# Patient Record
Sex: MALE | Race: WHITE | NOT HISPANIC OR LATINO | Employment: UNEMPLOYED | URBAN - NONMETROPOLITAN AREA
[De-identification: names, ages, dates, MRNs, and addresses within clinical notes are randomized per-mention and may not be internally consistent; named-entity substitution may affect disease eponyms.]

---
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
## 2024-02-25 ENCOUNTER — APPOINTMENT (OUTPATIENT)
Dept: CT IMAGING | Facility: HOSPITAL | Age: 24
End: 2024-02-25
Payer: MEDICAID

## 2024-02-25 ENCOUNTER — HOSPITAL ENCOUNTER (EMERGENCY)
Facility: HOSPITAL | Age: 24
Discharge: HOME | End: 2024-02-25
Attending: EMERGENCY MEDICINE | Admitting: EMERGENCY MEDICINE
Payer: MEDICAID

## 2024-02-25 VITALS
DIASTOLIC BLOOD PRESSURE: 83 MMHG | HEIGHT: 66 IN | RESPIRATION RATE: 16 BRPM | BODY MASS INDEX: 34.55 KG/M2 | TEMPERATURE: 98 F | HEART RATE: 90 BPM | SYSTOLIC BLOOD PRESSURE: 178 MMHG | OXYGEN SATURATION: 97 % | WEIGHT: 215 LBS

## 2024-02-25 DIAGNOSIS — N20.0 KIDNEY STONE: Primary | ICD-10-CM

## 2024-02-25 LAB
ALBUMIN SERPL-MCNC: 4.6 G/DL (ref 3.5–5.2)
ALBUMIN/GLOB SERPL: 1.3 G/DL
ALP SERPL-CCNC: 76 U/L (ref 39–117)
ALT SERPL W P-5'-P-CCNC: 20 U/L (ref 1–41)
ANION GAP SERPL CALCULATED.3IONS-SCNC: 14.4 MMOL/L (ref 5–15)
AST SERPL-CCNC: 25 U/L (ref 1–40)
BACTERIA UR QL AUTO: ABNORMAL /HPF
BASOPHILS # BLD AUTO: 0.08 10*3/MM3 (ref 0–0.2)
BASOPHILS NFR BLD AUTO: 0.8 % (ref 0–1.5)
BILIRUB SERPL-MCNC: 0.3 MG/DL (ref 0–1.2)
BILIRUB UR QL STRIP: NEGATIVE
BUN SERPL-MCNC: 23 MG/DL (ref 6–20)
BUN/CREAT SERPL: 20.7 (ref 7–25)
CALCIUM SPEC-SCNC: 9 MG/DL (ref 8.6–10.5)
CHLORIDE SERPL-SCNC: 107 MMOL/L (ref 98–107)
CLARITY UR: ABNORMAL
CO2 SERPL-SCNC: 21.6 MMOL/L (ref 22–29)
COLOR UR: YELLOW
CREAT SERPL-MCNC: 1.11 MG/DL (ref 0.76–1.27)
DEPRECATED RDW RBC AUTO: 43.7 FL (ref 37–54)
EGFRCR SERPLBLD CKD-EPI 2021: 96.9 ML/MIN/1.73
EOSINOPHIL # BLD AUTO: 0.31 10*3/MM3 (ref 0–0.4)
EOSINOPHIL NFR BLD AUTO: 3.2 % (ref 0.3–6.2)
ERYTHROCYTE [DISTWIDTH] IN BLOOD BY AUTOMATED COUNT: 13.1 % (ref 12.3–15.4)
GLOBULIN UR ELPH-MCNC: 3.5 GM/DL
GLUCOSE SERPL-MCNC: 135 MG/DL (ref 65–99)
GLUCOSE UR STRIP-MCNC: NEGATIVE MG/DL
HCT VFR BLD AUTO: 46.3 % (ref 37.5–51)
HGB BLD-MCNC: 15.2 G/DL (ref 13–17.7)
HGB UR QL STRIP.AUTO: ABNORMAL
HYALINE CASTS UR QL AUTO: ABNORMAL /LPF
IMM GRANULOCYTES # BLD AUTO: 0.06 10*3/MM3 (ref 0–0.05)
IMM GRANULOCYTES NFR BLD AUTO: 0.6 % (ref 0–0.5)
KETONES UR QL STRIP: NEGATIVE
LEUKOCYTE ESTERASE UR QL STRIP.AUTO: NEGATIVE
LYMPHOCYTES # BLD AUTO: 1.49 10*3/MM3 (ref 0.7–3.1)
LYMPHOCYTES NFR BLD AUTO: 15.4 % (ref 19.6–45.3)
MCH RBC QN AUTO: 29.9 PG (ref 26.6–33)
MCHC RBC AUTO-ENTMCNC: 32.8 G/DL (ref 31.5–35.7)
MCV RBC AUTO: 91 FL (ref 79–97)
MONOCYTES # BLD AUTO: 0.53 10*3/MM3 (ref 0.1–0.9)
MONOCYTES NFR BLD AUTO: 5.5 % (ref 5–12)
NEUTROPHILS NFR BLD AUTO: 7.23 10*3/MM3 (ref 1.7–7)
NEUTROPHILS NFR BLD AUTO: 74.5 % (ref 42.7–76)
NITRITE UR QL STRIP: NEGATIVE
NRBC BLD AUTO-RTO: 0 /100 WBC (ref 0–0.2)
PH UR STRIP.AUTO: 5.5 [PH] (ref 5–8)
PLATELET # BLD AUTO: 265 10*3/MM3 (ref 140–450)
PMV BLD AUTO: 10.1 FL (ref 6–12)
POTASSIUM SERPL-SCNC: 4 MMOL/L (ref 3.5–5.2)
PROT SERPL-MCNC: 8.1 G/DL (ref 6–8.5)
PROT UR QL STRIP: ABNORMAL
RBC # BLD AUTO: 5.09 10*6/MM3 (ref 4.14–5.8)
RBC # UR STRIP: ABNORMAL /HPF
REF LAB TEST METHOD: ABNORMAL
SODIUM SERPL-SCNC: 143 MMOL/L (ref 136–145)
SP GR UR STRIP: >1.03 (ref 1–1.03)
SQUAMOUS #/AREA URNS HPF: ABNORMAL /HPF
UROBILINOGEN UR QL STRIP: ABNORMAL
WBC # UR STRIP: ABNORMAL /HPF
WBC NRBC COR # BLD AUTO: 9.7 10*3/MM3 (ref 3.4–10.8)

## 2024-02-25 PROCEDURE — 99284 EMERGENCY DEPT VISIT MOD MDM: CPT

## 2024-02-25 PROCEDURE — 81001 URINALYSIS AUTO W/SCOPE: CPT | Performed by: PHYSICIAN ASSISTANT

## 2024-02-25 PROCEDURE — 96374 THER/PROPH/DIAG INJ IV PUSH: CPT

## 2024-02-25 PROCEDURE — 74176 CT ABD & PELVIS W/O CONTRAST: CPT | Performed by: RADIOLOGY

## 2024-02-25 PROCEDURE — 85025 COMPLETE CBC W/AUTO DIFF WBC: CPT | Performed by: PHYSICIAN ASSISTANT

## 2024-02-25 PROCEDURE — 25010000002 ONDANSETRON PER 1 MG: Performed by: PHYSICIAN ASSISTANT

## 2024-02-25 PROCEDURE — 25810000003 SODIUM CHLORIDE 0.9 % SOLUTION: Performed by: PHYSICIAN ASSISTANT

## 2024-02-25 PROCEDURE — 96375 TX/PRO/DX INJ NEW DRUG ADDON: CPT

## 2024-02-25 PROCEDURE — 74176 CT ABD & PELVIS W/O CONTRAST: CPT

## 2024-02-25 PROCEDURE — 25010000002 HYDROMORPHONE 1 MG/ML SOLUTION: Performed by: EMERGENCY MEDICINE

## 2024-02-25 PROCEDURE — 25010000002 KETOROLAC TROMETHAMINE PER 15 MG: Performed by: PHYSICIAN ASSISTANT

## 2024-02-25 PROCEDURE — 25010000002 PROCHLORPERAZINE 10 MG/2ML SOLUTION: Performed by: PHYSICIAN ASSISTANT

## 2024-02-25 PROCEDURE — 80053 COMPREHEN METABOLIC PANEL: CPT | Performed by: PHYSICIAN ASSISTANT

## 2024-02-25 RX ORDER — HYDROCODONE BITARTRATE AND ACETAMINOPHEN 7.5; 325 MG/1; MG/1
1 TABLET ORAL EVERY 6 HOURS PRN
Qty: 12 TABLET | Refills: 0 | Status: SHIPPED | OUTPATIENT
Start: 2024-02-25

## 2024-02-25 RX ORDER — SODIUM CHLORIDE 0.9 % (FLUSH) 0.9 %
10 SYRINGE (ML) INJECTION AS NEEDED
Status: DISCONTINUED | OUTPATIENT
Start: 2024-02-25 | End: 2024-02-25 | Stop reason: HOSPADM

## 2024-02-25 RX ORDER — PROCHLORPERAZINE EDISYLATE 5 MG/ML
10 INJECTION INTRAMUSCULAR; INTRAVENOUS ONCE
Status: COMPLETED | OUTPATIENT
Start: 2024-02-25 | End: 2024-02-25

## 2024-02-25 RX ORDER — ONDANSETRON 2 MG/ML
4 INJECTION INTRAMUSCULAR; INTRAVENOUS ONCE
Status: COMPLETED | OUTPATIENT
Start: 2024-02-25 | End: 2024-02-25

## 2024-02-25 RX ORDER — TAMSULOSIN HYDROCHLORIDE 0.4 MG/1
1 CAPSULE ORAL DAILY
Qty: 30 CAPSULE | Refills: 0 | Status: SHIPPED | OUTPATIENT
Start: 2024-02-25

## 2024-02-25 RX ORDER — KETOROLAC TROMETHAMINE 30 MG/ML
30 INJECTION, SOLUTION INTRAMUSCULAR; INTRAVENOUS ONCE
Status: COMPLETED | OUTPATIENT
Start: 2024-02-25 | End: 2024-02-25

## 2024-02-25 RX ADMIN — HYDROMORPHONE HYDROCHLORIDE 1 MG: 1 INJECTION, SOLUTION INTRAMUSCULAR; INTRAVENOUS; SUBCUTANEOUS at 15:21

## 2024-02-25 RX ADMIN — ONDANSETRON 4 MG: 2 INJECTION INTRAMUSCULAR; INTRAVENOUS at 12:49

## 2024-02-25 RX ADMIN — KETOROLAC TROMETHAMINE 30 MG: 30 INJECTION, SOLUTION INTRAMUSCULAR at 12:50

## 2024-02-25 RX ADMIN — PROCHLORPERAZINE EDISYLATE 10 MG: 5 INJECTION INTRAMUSCULAR; INTRAVENOUS at 13:57

## 2024-02-25 RX ADMIN — SODIUM CHLORIDE 1000 ML: 9 INJECTION, SOLUTION INTRAVENOUS at 12:48

## 2024-02-25 NOTE — ED PROVIDER NOTES
Subjective   History of Present Illness  Pt reports waking up a couple hours PTA with severe pain R lower abdomen, hx of kidney stone  Denies any fever   Had nausea and vomiting pta   Pt has no PMHX       History provided by:  Patient   used: No    Abdominal Pain  Pain location:  R flank  Pain quality: aching    Pain radiates to:  Does not radiate  Pain severity:  Moderate  Onset quality:  Sudden  Timing:  Constant  Progression:  Worsening  Chronicity:  New  Relieved by:  Nothing  Worsened by:  Nothing  Ineffective treatments:  None tried      Review of Systems   Gastrointestinal:  Positive for abdominal pain.       Past Medical History:   Diagnosis Date    Asthma     GERD (gastroesophageal reflux disease)     Kidney stone        No Known Allergies    Past Surgical History:   Procedure Laterality Date    CYSTOSCOPY URETEROSCOPY LASER LITHOTRIPSY Right 7/8/2020    Procedure: RIGHT CYSTOSCOPY URETEROSCOPY WITH RIGHT RETROGRADES;  Surgeon: Cornell Tate MD;  Location: [facility];  Service: Urology;  Laterality: Right;    EAR TUBES         Family History   Problem Relation Age of Onset    No Known Problems Father     No Known Problems Mother        Social History     Socioeconomic History    Marital status: Single   Tobacco Use    Smoking status: Never    Smokeless tobacco: Never   Vaping Use    Vaping Use: Never used   Substance and Sexual Activity    Alcohol use: Never    Drug use: Never    Sexual activity: Defer           Objective   Physical Exam  Vitals and nursing note reviewed.   Constitutional:       Appearance: He is well-developed.   HENT:      Head: Normocephalic.   Cardiovascular:      Rate and Rhythm: Normal rate and regular rhythm.   Pulmonary:      Effort: Pulmonary effort is normal.      Breath sounds: Normal breath sounds.   Abdominal:      General: Bowel sounds are normal.      Palpations: Abdomen is soft.      Tenderness: There is no abdominal tenderness. There is no  guarding or rebound. Negative signs include McBurney's sign.   Musculoskeletal:         General: Normal range of motion.      Cervical back: Neck supple.   Skin:     General: Skin is warm and dry.   Neurological:      Mental Status: He is alert and oriented to person, place, and time.   Psychiatric:         Behavior: Behavior normal.         Thought Content: Thought content normal.         Judgment: Judgment normal.         Procedures           ED Course      Results for orders placed or performed during the hospital encounter of 02/25/24   Comprehensive Metabolic Panel    Specimen: Blood   Result Value Ref Range    Glucose 135 (H) 65 - 99 mg/dL    BUN 23 (H) 6 - 20 mg/dL    Creatinine 1.11 0.76 - 1.27 mg/dL    Sodium 143 136 - 145 mmol/L    Potassium 4.0 3.5 - 5.2 mmol/L    Chloride 107 98 - 107 mmol/L    CO2 21.6 (L) 22.0 - 29.0 mmol/L    Calcium 9.0 8.6 - 10.5 mg/dL    Total Protein 8.1 6.0 - 8.5 g/dL    Albumin 4.6 3.5 - 5.2 g/dL    ALT (SGPT) 20 1 - 41 U/L    AST (SGOT) 25 1 - 40 U/L    Alkaline Phosphatase 76 39 - 117 U/L    Total Bilirubin 0.3 0.0 - 1.2 mg/dL    Globulin 3.5 gm/dL    A/G Ratio 1.3 g/dL    BUN/Creatinine Ratio 20.7 7.0 - 25.0    Anion Gap 14.4 5.0 - 15.0 mmol/L    eGFR 96.9 >60.0 mL/min/1.73   Urinalysis With Culture If Indicated - Urine, Clean Catch    Specimen: Urine, Clean Catch   Result Value Ref Range    Color, UA Yellow Yellow, Straw    Appearance, UA Cloudy (A) Clear    pH, UA 5.5 5.0 - 8.0    Specific Gravity, UA >1.030 (H) 1.005 - 1.030    Glucose, UA Negative Negative    Ketones, UA Negative Negative    Bilirubin, UA Negative Negative    Blood, UA Large (3+) (A) Negative    Protein, UA 30 mg/dL (1+) (A) Negative    Leuk Esterase, UA Negative Negative    Nitrite, UA Negative Negative    Urobilinogen, UA 0.2 E.U./dL 0.2 - 1.0 E.U./dL   CBC Auto Differential    Specimen: Blood   Result Value Ref Range    WBC 9.70 3.40 - 10.80 10*3/mm3    RBC 5.09 4.14 - 5.80 10*6/mm3    Hemoglobin 15.2  13.0 - 17.7 g/dL    Hematocrit 46.3 37.5 - 51.0 %    MCV 91.0 79.0 - 97.0 fL    MCH 29.9 26.6 - 33.0 pg    MCHC 32.8 31.5 - 35.7 g/dL    RDW 13.1 12.3 - 15.4 %    RDW-SD 43.7 37.0 - 54.0 fl    MPV 10.1 6.0 - 12.0 fL    Platelets 265 140 - 450 10*3/mm3    Neutrophil % 74.5 42.7 - 76.0 %    Lymphocyte % 15.4 (L) 19.6 - 45.3 %    Monocyte % 5.5 5.0 - 12.0 %    Eosinophil % 3.2 0.3 - 6.2 %    Basophil % 0.8 0.0 - 1.5 %    Immature Grans % 0.6 (H) 0.0 - 0.5 %    Neutrophils, Absolute 7.23 (H) 1.70 - 7.00 10*3/mm3    Lymphocytes, Absolute 1.49 0.70 - 3.10 10*3/mm3    Monocytes, Absolute 0.53 0.10 - 0.90 10*3/mm3    Eosinophils, Absolute 0.31 0.00 - 0.40 10*3/mm3    Basophils, Absolute 0.08 0.00 - 0.20 10*3/mm3    Immature Grans, Absolute 0.06 (H) 0.00 - 0.05 10*3/mm3    nRBC 0.0 0.0 - 0.2 /100 WBC   Urinalysis, Microscopic Only - Urine, Clean Catch    Specimen: Urine, Clean Catch   Result Value Ref Range    RBC, UA Too Numerous to Count (A) None Seen, 0-2 /HPF    WBC, UA 0-2 None Seen, 0-2 /HPF    Bacteria, UA None Seen None Seen /HPF    Squamous Epithelial Cells, UA 0-2 None Seen, 0-2 /HPF    Hyaline Casts, UA None Seen None Seen /LPF    Methodology Automated Microscopy                                  CT Abdomen Pelvis Stone Protocol   Final Result       1.  Partial right renal obstruction due to a 5.2 mm stone in the lower   right ureter seen best on image 90, series 2.   2.  Right nephrolithiasis with 1 mm stone at the upper pole of the right   kidney.   3.  Normal appendix is well seen.   4.  No free fluid or free air.   5.  No abscess or hematoma.    6.  No bowel or left renal obstruction.           This report was finalized on 2/25/2024 3:20 PM by Randolph Rangel MD.                     Medical Decision Making  Pt reports waking up a couple hours PTA with severe pain R lower abdomen, hx of kidney stone  Denies any fever   Had nausea and vomiting pta   Pt has no PMHX         Problems Addressed:  Kidney stone:  complicated acute illness or injury    Amount and/or Complexity of Data Reviewed  Labs: ordered.  Radiology: ordered.    Risk  Prescription drug management.  Risk Details: Lisa Blayne    Patient is stable.  Patient is medically cleared.  No EMC exist.  Patient is discharged home.  Patient's diagnostic results were discussed with him and they demonstrated understanding of diagnosis and treatment plan.  Patient was advised to return to the ER or follow-up with PCP if symptoms worsen or fail to improve as expected.         Final diagnoses:   Kidney stone       ED Disposition  ED Disposition       ED Disposition   Discharge    Condition   Stable    Comment   --               Angelito Hill PA-C  [address]  [address]  [place] [address]  [phone]    In 1 day  call in am         Medication List        New Prescriptions      HYDROcodone-acetaminophen 7.5-325 MG per tablet  Commonly known as: NORCO  Take 1 tablet by mouth Every 6 (Six) Hours As Needed for Moderate Pain.  Replaces: HYDROcodone-acetaminophen 5-325 MG per tablet     tamsulosin 0.4 MG capsule 24 hr capsule  Commonly known as: FLOMAX  Take 1 capsule by mouth Daily.            Stop      HYDROcodone-acetaminophen 5-325 MG per tablet  Commonly known as: NORCO  Replaced by: HYDROcodone-acetaminophen 7.5-325 MG per tablet     oxyCODONE-acetaminophen 7.5-325 MG per tablet  Commonly known as: PERCOCET               Where to Get Your Medications        These medications were sent to [id number] - [place], [place] - [address] AT [facility] - [phone]  - [phone] FX  [address] [address]      Phone: [phone]   HYDROcodone-acetaminophen 7.5-325 MG per tablet  tamsulosin 0.4 MG capsule 24 hr capsule            Lisa Cisneros PA  02/28/24 [id number]

## 2024-02-25 NOTE — Clinical Note
[facility] EMERGENCY DEPARTMENT  [address] [address]  Phone: [phone]    Estevan Galindo was seen and treated in our emergency department on 2/25/2024.  He may return to work on 02/27/2024.         Thank you for choosing [facility].    Lisa Cisneros PA

## 2024-02-27 ENCOUNTER — TELEPHONE (OUTPATIENT)
Dept: UROLOGY | Facility: CLINIC | Age: 24
End: 2024-02-27
Payer: MEDICAID

## 2024-02-27 ENCOUNTER — OFFICE VISIT (OUTPATIENT)
Dept: UROLOGY | Facility: CLINIC | Age: 24
End: 2024-02-27
Payer: MEDICAID

## 2024-02-27 VITALS
BODY MASS INDEX: 37.28 KG/M2 | WEIGHT: 232 LBS | SYSTOLIC BLOOD PRESSURE: 163 MMHG | DIASTOLIC BLOOD PRESSURE: 79 MMHG | HEART RATE: 87 BPM | HEIGHT: 66 IN

## 2024-02-27 DIAGNOSIS — N20.1 RIGHT URETERAL STONE: Primary | ICD-10-CM

## 2024-02-27 RX ORDER — GENTAMICIN SULFATE 80 MG/100ML
80 INJECTION, SOLUTION INTRAVENOUS ONCE
Status: CANCELLED | OUTPATIENT
Start: 2024-02-27 | End: 2024-02-27

## 2024-02-27 NOTE — H&P (VIEW-ONLY)
"Chief Complaint:    Chief Complaint   Patient presents with    Nephrolithiasis     Er follow up / new patient     Abdominal Pain       Vital Signs:   /79   Pulse 87   Ht 167.6 cm (66\")   Wt 105 kg (232 lb)   BMI 37.45 kg/m²   Body mass index is 37.45 kg/m².      HPI:  Estevan Galindo is a 24 y.o. male who presents today for initial evaluation     History of Present Illness  Mr. Galindo presents to the clinic for evaluation of nephrolithiasis.  Patient has a past medical history significant for right ureteral stone that measured roughly 4 mm in size and was seen by Dr. Tate in 2020.  He underwent a right uteroscopy with holmium laser lithotripsy and stone extraction at that time.  He denies passing any stones since.  He was recently seen in the emergency department on 2/25/2024 due to right-sided back and flank pain.  He had a CT scan completed at that time that showed mild to moderate right-sided hydronephrosis secondary to a distal 5.2 mm right ureteral calculus.  This was near the iliac vessel juncture.  Patient did have a nonobstructing 1 mm right intrarenal calculi.  He does endorse drinking sweet tea and several glasses of milk daily.  He had a CMP completed at time of the ER visit that showed a normal GFR of 96 and normal creatinine at 1.11.  White blood cell count was normal as well.  Patient has been on prescribed pain medication as needed with no significant complaints at this time.  He does not believe he is passed the stone.  Will get him scheduled for surgery soon as possible  Abdominal Pain  Associated symptoms include anxiety and frequency. Pertinent negatives include no constipation, diarrhea, fever, headaches, hematuria, nausea, rash or vomiting.       Past Medical History:  Past Medical History:   Diagnosis Date    Asthma     GERD (gastroesophageal reflux disease)     Kidney stone        Current Meds:  Current Outpatient Medications   Medication Sig Dispense Refill    " HYDROcodone-acetaminophen (NORCO) 7.5-325 MG per tablet Take 1 tablet by mouth Every 6 (Six) Hours As Needed for Moderate Pain. 12 tablet 0    tamsulosin (FLOMAX) 0.4 MG capsule 24 hr capsule Take 1 capsule by mouth Daily. 30 capsule 0     No current facility-administered medications for this visit.        Allergies:   No Known Allergies     Past Surgical History:  Past Surgical History:   Procedure Laterality Date    CYSTOSCOPY URETEROSCOPY LASER LITHOTRIPSY Right 7/8/2020    Procedure: RIGHT CYSTOSCOPY URETEROSCOPY WITH RIGHT RETROGRADES;  Surgeon: Cornell Tate MD;  Location: [facility];  Service: Urology;  Laterality: Right;    EAR TUBES         Social History:  Social History     Socioeconomic History    Marital status: Single   Tobacco Use    Smoking status: Never    Smokeless tobacco: Never   Vaping Use    Vaping Use: Never used   Substance and Sexual Activity    Alcohol use: Never    Drug use: Never    Sexual activity: Defer       Family History:  Family History   Problem Relation Age of Onset    No Known Problems Father     No Known Problems Mother        Review of Systems:  Review of Systems   Constitutional:  Negative for chills, fatigue and fever.   HENT:  Negative for congestion and sinus pressure.    Respiratory:  Negative for chest tightness and shortness of breath.    Cardiovascular:  Negative for chest pain.   Gastrointestinal:  Positive for abdominal pain. Negative for constipation, diarrhea, nausea and vomiting.   Genitourinary:  Positive for flank pain and frequency. Negative for hematuria and urgency.   Musculoskeletal:  Positive for back pain. Negative for neck pain.   Skin:  Negative for rash.   Neurological:  Negative for dizziness and headaches.   Hematological:  Bruises/bleeds easily.   Psychiatric/Behavioral:  The patient is nervous/anxious.        Physical Exam:  Physical Exam  Constitutional:       General: He is not in acute distress.     Appearance: Normal appearance.   HENT:       Head: Normocephalic.      Mouth/Throat:      Mouth: Mucous membranes are moist.      Pharynx: Oropharynx is clear.   Eyes:      Extraocular Movements: Extraocular movements intact.      Conjunctiva/sclera: Conjunctivae normal.   Cardiovascular:      Rate and Rhythm: Normal rate and regular rhythm.      Pulses: Normal pulses.      Heart sounds: Normal heart sounds.   Pulmonary:      Effort: Pulmonary effort is normal.      Breath sounds: Normal breath sounds.   Abdominal:      General: Abdomen is flat. Bowel sounds are normal.      Palpations: Abdomen is soft.   Musculoskeletal:      Cervical back: Normal range of motion.   Skin:     General: Skin is warm.   Neurological:      General: No focal deficit present.      Mental Status: He is alert and oriented to person, place, and time.   Psychiatric:         Mood and Affect: Mood normal.         Thought Content: Thought content normal.         Judgment: Judgment normal.           Recent Image (CT and/or KUB):   CT Abdomen and Pelvis: No results found for this or any previous visit.     CT Stone Protocol: Results for orders placed during the hospital encounter of 02/25/24    CT Abdomen Pelvis Stone Protocol    Narrative  PROCEDURE: CT of the abdomen and pelvis performed without IV contrast on  February 25, 2024. Examination was performed with 4 mm axial imaging and  4 mm sagittal and coronal reconstruction images. Total DLP = 1003. The  examination was performed according to as low as reasonably achievable  dose protocol.    HISTORY: Right flank pain. History of renal stones.    COMPARISON: None.    FINDINGS:    Normal heart size.  No acute process seen in the liver, gallbladder, spleen, pancreas,  adrenal glands, and kidneys.  1 mm stone at the upper pole of the right kidney seen best on image 39,  series 2.  Right hydronephrosis and partial right renal obstruction due to a 5.2 mm  stone in the lower right ureter seen best on image 90, series 2.  A normal appendix  is well seen.  Mild constipation involving the right colon.  Left and right lower pelvic phleboliths.  No free fluid or free air.  No abscess or hematoma.  No abdominal aortic aneurysm or retroperitoneal hemorrhage.    Impression  1.  Partial right renal obstruction due to a 5.2 mm stone in the lower  right ureter seen best on image 90, series 2.  2.  Right nephrolithiasis with 1 mm stone at the upper pole of the right  kidney.  3.  Normal appendix is well seen.  4.  No free fluid or free air.  5.  No abscess or hematoma.  6.  No bowel or left renal obstruction.      This report was finalized on 2/25/2024 3:20 PM by Randolph Rangel MD.     KUB: Results for orders placed during the hospital encounter of 10/20/20    XR Abdomen KUB    Narrative  XR ABDOMEN KUB-    REASON FOR EXAM:  KIDNEY STONE; N20.1-Calculus of ureter    Today's KUB is compared with an earlier exam done in July. No  calcifications were demonstratable on the KUB overlying the kidneys,  ureter or urinary bladder. No soft tissue masses demonstrated. The bony  structures are appropriate.    Impression  Negative supine view of the abdomen.    This report was finalized on 10/20/2020 12:27 PM by Dr. Ren Carlos II, MD.       Labs:  Brief Urine Lab Results  (Last result in the past 365 days)        Color   Clarity   Blood   Leuk Est   Nitrite   Protein   CREAT   Urine HCG        02/25/24 1332 Yellow   Cloudy   Large (3+)   Negative   Negative   30 mg/dL (1+)                 Admission on 02/25/2024, Discharged on 02/25/2024   Component Date Value Ref Range Status    Glucose 02/25/2024 135 (H)  65 - 99 mg/dL Final    BUN 02/25/2024 23 (H)  6 - 20 mg/dL Final    Creatinine 02/25/2024 1.11  0.76 - 1.27 mg/dL Final    Sodium 02/25/2024 143  136 - 145 mmol/L Final    Potassium 02/25/2024 4.0  3.5 - 5.2 mmol/L Final    Chloride 02/25/2024 107  98 - 107 mmol/L Final    CO2 02/25/2024 21.6 (L)  22.0 - 29.0 mmol/L Final    Calcium 02/25/2024 9.0  8.6 - 10.5 mg/dL  Final    Total Protein 02/25/2024 8.1  6.0 - 8.5 g/dL Final    Albumin 02/25/2024 4.6  3.5 - 5.2 g/dL Final    ALT (SGPT) 02/25/2024 20  1 - 41 U/L Final    AST (SGOT) 02/25/2024 25  1 - 40 U/L Final    Alkaline Phosphatase 02/25/2024 76  39 - 117 U/L Final    Total Bilirubin 02/25/2024 0.3  0.0 - 1.2 mg/dL Final    Globulin 02/25/2024 3.5  gm/dL Final    A/G Ratio 02/25/2024 1.3  g/dL Final    BUN/Creatinine Ratio 02/25/2024 20.7  7.0 - 25.0 Final    Anion Gap 02/25/2024 14.4  5.0 - 15.0 mmol/L Final    eGFR 02/25/2024 96.9  >60.0 mL/min/1.73 Final    Color, UA 02/25/2024 Yellow  Yellow, Straw Final    Appearance, UA 02/25/2024 Cloudy (A)  Clear Final    pH, UA 02/25/2024 5.5  5.0 - 8.0 Final    Specific Gravity, UA 02/25/2024 >1.030 (H)  1.005 - 1.030 Final    Glucose, UA 02/25/2024 Negative  Negative Final    Ketones, UA 02/25/2024 Negative  Negative Final    Bilirubin, UA 02/25/2024 Negative  Negative Final    Blood, UA 02/25/2024 Large (3+) (A)  Negative Final    Protein, UA 02/25/2024 30 mg/dL (1+) (A)  Negative Final    Leuk Esterase, UA 02/25/2024 Negative  Negative Final    Nitrite, UA 02/25/2024 Negative  Negative Final    Urobilinogen, UA 02/25/2024 0.2 E.U./dL  0.2 - 1.0 E.U./dL Final    WBC 02/25/2024 9.70  3.40 - 10.80 10*3/mm3 Final    RBC 02/25/2024 5.09  4.14 - 5.80 10*6/mm3 Final    Hemoglobin 02/25/2024 15.2  13.0 - 17.7 g/dL Final    Hematocrit 02/25/2024 46.3  37.5 - 51.0 % Final    MCV 02/25/2024 91.0  79.0 - 97.0 fL Final    MCH 02/25/2024 29.9  26.6 - 33.0 pg Final    MCHC 02/25/2024 32.8  31.5 - 35.7 g/dL Final    RDW 02/25/2024 13.1  12.3 - 15.4 % Final    RDW-SD 02/25/2024 43.7  37.0 - 54.0 fl Final    MPV 02/25/2024 10.1  6.0 - 12.0 fL Final    Platelets 02/25/2024 265  140 - 450 10*3/mm3 Final    Neutrophil % 02/25/2024 74.5  42.7 - 76.0 % Final    Lymphocyte % 02/25/2024 15.4 (L)  19.6 - 45.3 % Final    Monocyte % 02/25/2024 5.5  5.0 - 12.0 % Final    Eosinophil % 02/25/2024 3.2  0.3 -  6.2 % Final    Basophil % 02/25/2024 0.8  0.0 - 1.5 % Final    Immature Grans % 02/25/2024 0.6 (H)  0.0 - 0.5 % Final    Neutrophils, Absolute 02/25/2024 7.23 (H)  1.70 - 7.00 10*3/mm3 Final    Lymphocytes, Absolute 02/25/2024 1.49  0.70 - 3.10 10*3/mm3 Final    Monocytes, Absolute 02/25/2024 0.53  0.10 - 0.90 10*3/mm3 Final    Eosinophils, Absolute 02/25/2024 0.31  0.00 - 0.40 10*3/mm3 Final    Basophils, Absolute 02/25/2024 0.08  0.00 - 0.20 10*3/mm3 Final    Immature Grans, Absolute 02/25/2024 0.06 (H)  0.00 - 0.05 10*3/mm3 Final    nRBC 02/25/2024 0.0  0.0 - 0.2 /100 WBC Final    RBC, UA 02/25/2024 Too Numerous to Count (A)  None Seen, 0-2 /HPF Final    WBC, UA 02/25/2024 0-2  None Seen, 0-2 /HPF Final    Urine culture not indicated.    Bacteria, UA 02/25/2024 None Seen  None Seen /HPF Final    Squamous Epithelial Cells, UA 02/25/2024 0-2  None Seen, 0-2 /HPF Final    Hyaline Casts, UA 02/25/2024 None Seen  None Seen /LPF Final    Methodology 02/25/2024 Automated Microscopy   Final        Procedure: None  Procedures     I have reviewed and agree with the above PMH, PSH, FMH, social history, medications, allergies, and labs.     Assessment/Plan:   Problem List Items Addressed This Visit          Genitourinary and Reproductive     Right ureteral stone - Primary    Overview     Added automatically from request for surgery [id number]         Relevant Orders    Case Request (Completed)       Health Maintenance:   Health Maintenance Due   Topic Date Due    HEPATITIS C SCREENING  Never done    ANNUAL PHYSICAL  Never done    BMI FOLLOWUP  10/20/2021    INFLUENZA VACCINE  Never done    COVID-19 Vaccine (3 - 2023-24 season) 09/01/2023        Smoking Counseling: Never smoked or use smokeless tobacco    Urine Incontinence: Patient reports that he is not currently experiencing any symptoms of urinary incontinence.    Patient was given instructions and counseling regarding his condition or for health maintenance advice. Please  see specific information pulled into the AVS if appropriate.    Patient Education:   Renal calculus - It was discussed with the patient the presence of a 5.2 mm right ureteral distal stone. We discussed the various therapeutic options available including percutaneous nephrostolithotomy, ureteroscopy and extracorporeal shockwave  lithotripsy.  We discussed the risks of lithotripsy including the passage of stones leading to a 3% chance of Steinstrasse or a large string of stones in the distal ureter. In this incidence the patient was informed that a ureteroscopy is indicated for obstructing fragments.  Patient was informed of an extremely rare incidence of renal hematoma and the significance of this.  Patient was educated on percutaneous nephrostolithotomy and its use as well as the risks and benefits such as the need for postoperative hospitalization, and the risk of damage to the kidney and the remote risk of a nephrectomy.  We also discussed the use of ureteroscopy in the upper tracts and its decreased success rate to completely remove the stones likely causing stent placement leading to an additional procedure for removal.  We discussed the absolute relative indicators for intervention including the presence of sepsis and uncontrollable pain leading to need for urgent intervention.  We discussed placement of a stent if indicated and the management of the stent as well.  Given the patient has required surgery in the past and recommended surgical intervention with a right uteroscopy holmium laser lithotripsy and extraction.  Discussed this procedure in detail.  Advised patient to continue with prescribed pain medications as needed and Flomax once daily.  Advised him to increase water intake 2 to 3 L/day.  Educated patient to decrease intake of factors contributing to stone formation such as excessive dairy products and sweet or unsweet tea.  Otherwise we will see the patient back for a right uteroscopy this coming  Monday    Visit Diagnoses:    ICD-10-CM ICD-9-CM   1. Right ureteral stone  N20.1 592.1       Meds Ordered During Visit:  No orders of the defined types were placed in this encounter.      Follow Up Appointment: Right uteroscopy 3/4/2024  No follow-ups on file.      This document has been electronically signed by Angelito Hill PA-C   February 27, 2024 12:35 EST    Part of this note may be an electronic transcription/translation of spoken language to printed text using the Dragon Dictation System.

## 2024-02-27 NOTE — PROGRESS NOTES
"Chief Complaint:    Chief Complaint   Patient presents with    Nephrolithiasis     Er follow up / new patient     Abdominal Pain       Vital Signs:   /79   Pulse 87   Ht 167.6 cm (66\")   Wt 105 kg (232 lb)   BMI 37.45 kg/m²   Body mass index is 37.45 kg/m².      HPI:  Estevan Galindo is a 24 y.o. male who presents today for initial evaluation     History of Present Illness  Mr. Galindo presents to the clinic for evaluation of nephrolithiasis.  Patient has a past medical history significant for right ureteral stone that measured roughly 4 mm in size and was seen by Dr. Tate in 2020.  He underwent a right uteroscopy with holmium laser lithotripsy and stone extraction at that time.  He denies passing any stones since.  He was recently seen in the emergency department on 2/25/2024 due to right-sided back and flank pain.  He had a CT scan completed at that time that showed mild to moderate right-sided hydronephrosis secondary to a distal 5.2 mm right ureteral calculus.  This was near the iliac vessel juncture.  Patient did have a nonobstructing 1 mm right intrarenal calculi.  He does endorse drinking sweet tea and several glasses of milk daily.  He had a CMP completed at time of the ER visit that showed a normal GFR of 96 and normal creatinine at 1.11.  White blood cell count was normal as well.  Patient has been on prescribed pain medication as needed with no significant complaints at this time.  He does not believe he is passed the stone.  Will get him scheduled for surgery soon as possible  Abdominal Pain  Associated symptoms include anxiety and frequency. Pertinent negatives include no constipation, diarrhea, fever, headaches, hematuria, nausea, rash or vomiting.       Past Medical History:  Past Medical History:   Diagnosis Date    Asthma     GERD (gastroesophageal reflux disease)     Kidney stone        Current Meds:  Current Outpatient Medications   Medication Sig Dispense Refill    " HYDROcodone-acetaminophen (NORCO) 7.5-325 MG per tablet Take 1 tablet by mouth Every 6 (Six) Hours As Needed for Moderate Pain. 12 tablet 0    tamsulosin (FLOMAX) 0.4 MG capsule 24 hr capsule Take 1 capsule by mouth Daily. 30 capsule 0     No current facility-administered medications for this visit.        Allergies:   No Known Allergies     Past Surgical History:  Past Surgical History:   Procedure Laterality Date    CYSTOSCOPY URETEROSCOPY LASER LITHOTRIPSY Right 7/8/2020    Procedure: RIGHT CYSTOSCOPY URETEROSCOPY WITH RIGHT RETROGRADES;  Surgeon: Cornell Tate MD;  Location: [facility];  Service: Urology;  Laterality: Right;    EAR TUBES         Social History:  Social History     Socioeconomic History    Marital status: Single   Tobacco Use    Smoking status: Never    Smokeless tobacco: Never   Vaping Use    Vaping Use: Never used   Substance and Sexual Activity    Alcohol use: Never    Drug use: Never    Sexual activity: Defer       Family History:  Family History   Problem Relation Age of Onset    No Known Problems Father     No Known Problems Mother        Review of Systems:  Review of Systems   Constitutional:  Negative for chills, fatigue and fever.   HENT:  Negative for congestion and sinus pressure.    Respiratory:  Negative for chest tightness and shortness of breath.    Cardiovascular:  Negative for chest pain.   Gastrointestinal:  Positive for abdominal pain. Negative for constipation, diarrhea, nausea and vomiting.   Genitourinary:  Positive for flank pain and frequency. Negative for hematuria and urgency.   Musculoskeletal:  Positive for back pain. Negative for neck pain.   Skin:  Negative for rash.   Neurological:  Negative for dizziness and headaches.   Hematological:  Bruises/bleeds easily.   Psychiatric/Behavioral:  The patient is nervous/anxious.        Physical Exam:  Physical Exam  Constitutional:       General: He is not in acute distress.     Appearance: Normal appearance.   HENT:       Head: Normocephalic.      Mouth/Throat:      Mouth: Mucous membranes are moist.      Pharynx: Oropharynx is clear.   Eyes:      Extraocular Movements: Extraocular movements intact.      Conjunctiva/sclera: Conjunctivae normal.   Cardiovascular:      Rate and Rhythm: Normal rate and regular rhythm.      Pulses: Normal pulses.      Heart sounds: Normal heart sounds.   Pulmonary:      Effort: Pulmonary effort is normal.      Breath sounds: Normal breath sounds.   Abdominal:      General: Abdomen is flat. Bowel sounds are normal.      Palpations: Abdomen is soft.   Musculoskeletal:      Cervical back: Normal range of motion.   Skin:     General: Skin is warm.   Neurological:      General: No focal deficit present.      Mental Status: He is alert and oriented to person, place, and time.   Psychiatric:         Mood and Affect: Mood normal.         Thought Content: Thought content normal.         Judgment: Judgment normal.           Recent Image (CT and/or KUB):   CT Abdomen and Pelvis: No results found for this or any previous visit.     CT Stone Protocol: Results for orders placed during the hospital encounter of 02/25/24    CT Abdomen Pelvis Stone Protocol    Narrative  PROCEDURE: CT of the abdomen and pelvis performed without IV contrast on  February 25, 2024. Examination was performed with 4 mm axial imaging and  4 mm sagittal and coronal reconstruction images. Total DLP = 1003. The  examination was performed according to as low as reasonably achievable  dose protocol.    HISTORY: Right flank pain. History of renal stones.    COMPARISON: None.    FINDINGS:    Normal heart size.  No acute process seen in the liver, gallbladder, spleen, pancreas,  adrenal glands, and kidneys.  1 mm stone at the upper pole of the right kidney seen best on image 39,  series 2.  Right hydronephrosis and partial right renal obstruction due to a 5.2 mm  stone in the lower right ureter seen best on image 90, series 2.  A normal appendix  is well seen.  Mild constipation involving the right colon.  Left and right lower pelvic phleboliths.  No free fluid or free air.  No abscess or hematoma.  No abdominal aortic aneurysm or retroperitoneal hemorrhage.    Impression  1.  Partial right renal obstruction due to a 5.2 mm stone in the lower  right ureter seen best on image 90, series 2.  2.  Right nephrolithiasis with 1 mm stone at the upper pole of the right  kidney.  3.  Normal appendix is well seen.  4.  No free fluid or free air.  5.  No abscess or hematoma.  6.  No bowel or left renal obstruction.      This report was finalized on 2/25/2024 3:20 PM by Randolph Rangel MD.     KUB: Results for orders placed during the hospital encounter of 10/20/20    XR Abdomen KUB    Narrative  XR ABDOMEN KUB-    REASON FOR EXAM:  KIDNEY STONE; N20.1-Calculus of ureter    Today's KUB is compared with an earlier exam done in July. No  calcifications were demonstratable on the KUB overlying the kidneys,  ureter or urinary bladder. No soft tissue masses demonstrated. The bony  structures are appropriate.    Impression  Negative supine view of the abdomen.    This report was finalized on 10/20/2020 12:27 PM by Dr. Ren Carlos II, MD.       Labs:  Brief Urine Lab Results  (Last result in the past 365 days)        Color   Clarity   Blood   Leuk Est   Nitrite   Protein   CREAT   Urine HCG        02/25/24 1332 Yellow   Cloudy   Large (3+)   Negative   Negative   30 mg/dL (1+)                 Admission on 02/25/2024, Discharged on 02/25/2024   Component Date Value Ref Range Status    Glucose 02/25/2024 135 (H)  65 - 99 mg/dL Final    BUN 02/25/2024 23 (H)  6 - 20 mg/dL Final    Creatinine 02/25/2024 1.11  0.76 - 1.27 mg/dL Final    Sodium 02/25/2024 143  136 - 145 mmol/L Final    Potassium 02/25/2024 4.0  3.5 - 5.2 mmol/L Final    Chloride 02/25/2024 107  98 - 107 mmol/L Final    CO2 02/25/2024 21.6 (L)  22.0 - 29.0 mmol/L Final    Calcium 02/25/2024 9.0  8.6 - 10.5 mg/dL  Final    Total Protein 02/25/2024 8.1  6.0 - 8.5 g/dL Final    Albumin 02/25/2024 4.6  3.5 - 5.2 g/dL Final    ALT (SGPT) 02/25/2024 20  1 - 41 U/L Final    AST (SGOT) 02/25/2024 25  1 - 40 U/L Final    Alkaline Phosphatase 02/25/2024 76  39 - 117 U/L Final    Total Bilirubin 02/25/2024 0.3  0.0 - 1.2 mg/dL Final    Globulin 02/25/2024 3.5  gm/dL Final    A/G Ratio 02/25/2024 1.3  g/dL Final    BUN/Creatinine Ratio 02/25/2024 20.7  7.0 - 25.0 Final    Anion Gap 02/25/2024 14.4  5.0 - 15.0 mmol/L Final    eGFR 02/25/2024 96.9  >60.0 mL/min/1.73 Final    Color, UA 02/25/2024 Yellow  Yellow, Straw Final    Appearance, UA 02/25/2024 Cloudy (A)  Clear Final    pH, UA 02/25/2024 5.5  5.0 - 8.0 Final    Specific Gravity, UA 02/25/2024 >1.030 (H)  1.005 - 1.030 Final    Glucose, UA 02/25/2024 Negative  Negative Final    Ketones, UA 02/25/2024 Negative  Negative Final    Bilirubin, UA 02/25/2024 Negative  Negative Final    Blood, UA 02/25/2024 Large (3+) (A)  Negative Final    Protein, UA 02/25/2024 30 mg/dL (1+) (A)  Negative Final    Leuk Esterase, UA 02/25/2024 Negative  Negative Final    Nitrite, UA 02/25/2024 Negative  Negative Final    Urobilinogen, UA 02/25/2024 0.2 E.U./dL  0.2 - 1.0 E.U./dL Final    WBC 02/25/2024 9.70  3.40 - 10.80 10*3/mm3 Final    RBC 02/25/2024 5.09  4.14 - 5.80 10*6/mm3 Final    Hemoglobin 02/25/2024 15.2  13.0 - 17.7 g/dL Final    Hematocrit 02/25/2024 46.3  37.5 - 51.0 % Final    MCV 02/25/2024 91.0  79.0 - 97.0 fL Final    MCH 02/25/2024 29.9  26.6 - 33.0 pg Final    MCHC 02/25/2024 32.8  31.5 - 35.7 g/dL Final    RDW 02/25/2024 13.1  12.3 - 15.4 % Final    RDW-SD 02/25/2024 43.7  37.0 - 54.0 fl Final    MPV 02/25/2024 10.1  6.0 - 12.0 fL Final    Platelets 02/25/2024 265  140 - 450 10*3/mm3 Final    Neutrophil % 02/25/2024 74.5  42.7 - 76.0 % Final    Lymphocyte % 02/25/2024 15.4 (L)  19.6 - 45.3 % Final    Monocyte % 02/25/2024 5.5  5.0 - 12.0 % Final    Eosinophil % 02/25/2024 3.2  0.3 -  6.2 % Final    Basophil % 02/25/2024 0.8  0.0 - 1.5 % Final    Immature Grans % 02/25/2024 0.6 (H)  0.0 - 0.5 % Final    Neutrophils, Absolute 02/25/2024 7.23 (H)  1.70 - 7.00 10*3/mm3 Final    Lymphocytes, Absolute 02/25/2024 1.49  0.70 - 3.10 10*3/mm3 Final    Monocytes, Absolute 02/25/2024 0.53  0.10 - 0.90 10*3/mm3 Final    Eosinophils, Absolute 02/25/2024 0.31  0.00 - 0.40 10*3/mm3 Final    Basophils, Absolute 02/25/2024 0.08  0.00 - 0.20 10*3/mm3 Final    Immature Grans, Absolute 02/25/2024 0.06 (H)  0.00 - 0.05 10*3/mm3 Final    nRBC 02/25/2024 0.0  0.0 - 0.2 /100 WBC Final    RBC, UA 02/25/2024 Too Numerous to Count (A)  None Seen, 0-2 /HPF Final    WBC, UA 02/25/2024 0-2  None Seen, 0-2 /HPF Final    Urine culture not indicated.    Bacteria, UA 02/25/2024 None Seen  None Seen /HPF Final    Squamous Epithelial Cells, UA 02/25/2024 0-2  None Seen, 0-2 /HPF Final    Hyaline Casts, UA 02/25/2024 None Seen  None Seen /LPF Final    Methodology 02/25/2024 Automated Microscopy   Final        Procedure: None  Procedures     I have reviewed and agree with the above PMH, PSH, FMH, social history, medications, allergies, and labs.     Assessment/Plan:   Problem List Items Addressed This Visit          Genitourinary and Reproductive     Right ureteral stone - Primary    Overview     Added automatically from request for surgery [id number]         Relevant Orders    Case Request (Completed)       Health Maintenance:   Health Maintenance Due   Topic Date Due    HEPATITIS C SCREENING  Never done    ANNUAL PHYSICAL  Never done    BMI FOLLOWUP  10/20/2021    INFLUENZA VACCINE  Never done    COVID-19 Vaccine (3 - 2023-24 season) 09/01/2023        Smoking Counseling: Never smoked or use smokeless tobacco    Urine Incontinence: Patient reports that he is not currently experiencing any symptoms of urinary incontinence.    Patient was given instructions and counseling regarding his condition or for health maintenance advice. Please  see specific information pulled into the AVS if appropriate.    Patient Education:   Renal calculus - It was discussed with the patient the presence of a 5.2 mm right ureteral distal stone. We discussed the various therapeutic options available including percutaneous nephrostolithotomy, ureteroscopy and extracorporeal shockwave  lithotripsy.  We discussed the risks of lithotripsy including the passage of stones leading to a 3% chance of Steinstrasse or a large string of stones in the distal ureter. In this incidence the patient was informed that a ureteroscopy is indicated for obstructing fragments.  Patient was informed of an extremely rare incidence of renal hematoma and the significance of this.  Patient was educated on percutaneous nephrostolithotomy and its use as well as the risks and benefits such as the need for postoperative hospitalization, and the risk of damage to the kidney and the remote risk of a nephrectomy.  We also discussed the use of ureteroscopy in the upper tracts and its decreased success rate to completely remove the stones likely causing stent placement leading to an additional procedure for removal.  We discussed the absolute relative indicators for intervention including the presence of sepsis and uncontrollable pain leading to need for urgent intervention.  We discussed placement of a stent if indicated and the management of the stent as well.  Given the patient has required surgery in the past and recommended surgical intervention with a right uteroscopy holmium laser lithotripsy and extraction.  Discussed this procedure in detail.  Advised patient to continue with prescribed pain medications as needed and Flomax once daily.  Advised him to increase water intake 2 to 3 L/day.  Educated patient to decrease intake of factors contributing to stone formation such as excessive dairy products and sweet or unsweet tea.  Otherwise we will see the patient back for a right uteroscopy this coming  Monday    Visit Diagnoses:    ICD-10-CM ICD-9-CM   1. Right ureteral stone  N20.1 592.1       Meds Ordered During Visit:  No orders of the defined types were placed in this encounter.      Follow Up Appointment: Right uteroscopy 3/4/2024  No follow-ups on file.      This document has been electronically signed by Angelito Hill PA-C   February 27, 2024 12:35 EST    Part of this note may be an electronic transcription/translation of spoken language to printed text using the Dragon Dictation System.

## 2024-02-27 NOTE — TELEPHONE ENCOUNTER
The pt called while I was on lunch and wanted to reschedule her right eswl she missed on 2/23/24. I called Surgery scheduling her case request was cancelled and I am having the providers enter a new request and then I will call her back

## 2024-03-04 ENCOUNTER — HOSPITAL ENCOUNTER (OUTPATIENT)
Facility: HOSPITAL | Age: 24
Setting detail: HOSPITAL OUTPATIENT SURGERY
Discharge: HOME | End: 2024-03-04
Attending: UROLOGY | Admitting: UROLOGY
Payer: MEDICAID

## 2024-03-04 ENCOUNTER — ANESTHESIA (OUTPATIENT)
Dept: PERIOP | Facility: HOSPITAL | Age: 24
End: 2024-03-04
Payer: MEDICAID

## 2024-03-04 ENCOUNTER — APPOINTMENT (OUTPATIENT)
Dept: GENERAL RADIOLOGY | Facility: HOSPITAL | Age: 24
End: 2024-03-04
Payer: MEDICAID

## 2024-03-04 ENCOUNTER — ANESTHESIA EVENT (OUTPATIENT)
Dept: PERIOP | Facility: HOSPITAL | Age: 24
End: 2024-03-04
Payer: MEDICAID

## 2024-03-04 VITALS
HEIGHT: 66 IN | RESPIRATION RATE: 18 BRPM | BODY MASS INDEX: 37.12 KG/M2 | OXYGEN SATURATION: 100 % | SYSTOLIC BLOOD PRESSURE: 112 MMHG | HEART RATE: 75 BPM | TEMPERATURE: 97.5 F | WEIGHT: 231 LBS | DIASTOLIC BLOOD PRESSURE: 71 MMHG

## 2024-03-04 DIAGNOSIS — N20.1 RIGHT URETERAL STONE: ICD-10-CM

## 2024-03-04 PROCEDURE — 25510000001 IOPAMIDOL 61 % SOLUTION: Performed by: UROLOGY

## 2024-03-04 PROCEDURE — 25010000002 PROPOFOL 200 MG/20ML EMULSION: Performed by: NURSE ANESTHETIST, CERTIFIED REGISTERED

## 2024-03-04 PROCEDURE — 25010000002 ONDANSETRON PER 1 MG: Performed by: NURSE ANESTHETIST, CERTIFIED REGISTERED

## 2024-03-04 PROCEDURE — 25010000002 GENTAMICIN PER 80 MG

## 2024-03-04 PROCEDURE — 76000 FLUOROSCOPY <1 HR PHYS/QHP: CPT

## 2024-03-04 PROCEDURE — 25510000001 IOPAMIDOL 61 % SOLUTION

## 2024-03-04 PROCEDURE — 25810000003 LACTATED RINGERS PER 1000 ML: Performed by: ANESTHESIOLOGY

## 2024-03-04 PROCEDURE — 25010000002 FENTANYL CITRATE (PF) 50 MCG/ML SOLUTION: Performed by: NURSE ANESTHETIST, CERTIFIED REGISTERED

## 2024-03-04 PROCEDURE — 76000 FLUOROSCOPY <1 HR PHYS/QHP: CPT | Performed by: RADIOLOGY

## 2024-03-04 PROCEDURE — 25010000002 MIDAZOLAM PER 1 MG: Performed by: NURSE ANESTHETIST, CERTIFIED REGISTERED

## 2024-03-04 RX ORDER — SODIUM CHLORIDE 0.9 % (FLUSH) 0.9 %
10 SYRINGE (ML) INJECTION EVERY 12 HOURS SCHEDULED
Status: DISCONTINUED | OUTPATIENT
Start: 2024-03-04 | End: 2024-03-04 | Stop reason: HOSPADM

## 2024-03-04 RX ORDER — FENTANYL CITRATE 50 UG/ML
50 INJECTION, SOLUTION INTRAMUSCULAR; INTRAVENOUS
Status: DISCONTINUED | OUTPATIENT
Start: 2024-03-04 | End: 2024-03-04 | Stop reason: HOSPADM

## 2024-03-04 RX ORDER — ONDANSETRON 2 MG/ML
4 INJECTION INTRAMUSCULAR; INTRAVENOUS AS NEEDED
Status: DISCONTINUED | OUTPATIENT
Start: 2024-03-04 | End: 2024-03-04 | Stop reason: HOSPADM

## 2024-03-04 RX ORDER — MAGNESIUM HYDROXIDE 1200 MG/15ML
LIQUID ORAL AS NEEDED
Status: DISCONTINUED | OUTPATIENT
Start: 2024-03-04 | End: 2024-03-04 | Stop reason: HOSPADM

## 2024-03-04 RX ORDER — OXYCODONE HYDROCHLORIDE AND ACETAMINOPHEN 5; 325 MG/1; MG/1
1 TABLET ORAL ONCE AS NEEDED
Status: DISCONTINUED | OUTPATIENT
Start: 2024-03-04 | End: 2024-03-04 | Stop reason: HOSPADM

## 2024-03-04 RX ORDER — ONDANSETRON 2 MG/ML
INJECTION INTRAMUSCULAR; INTRAVENOUS AS NEEDED
Status: DISCONTINUED | OUTPATIENT
Start: 2024-03-04 | End: 2024-03-04 | Stop reason: SURG

## 2024-03-04 RX ORDER — GENTAMICIN SULFATE 80 MG/100ML
80 INJECTION, SOLUTION INTRAVENOUS ONCE
Status: COMPLETED | OUTPATIENT
Start: 2024-03-04 | End: 2024-03-04

## 2024-03-04 RX ORDER — HYDROCODONE BITARTRATE AND ACETAMINOPHEN 10; 325 MG/1; MG/1
1 TABLET ORAL EVERY 6 HOURS PRN
Qty: 12 TABLET | Refills: 0 | Status: SHIPPED | OUTPATIENT
Start: 2024-03-04

## 2024-03-04 RX ORDER — SODIUM CHLORIDE, SODIUM LACTATE, POTASSIUM CHLORIDE, CALCIUM CHLORIDE 600; 310; 30; 20 MG/100ML; MG/100ML; MG/100ML; MG/100ML
100 INJECTION, SOLUTION INTRAVENOUS ONCE AS NEEDED
Status: DISCONTINUED | OUTPATIENT
Start: 2024-03-04 | End: 2024-03-04 | Stop reason: HOSPADM

## 2024-03-04 RX ORDER — MEPERIDINE HYDROCHLORIDE 25 MG/ML
12.5 INJECTION INTRAMUSCULAR; INTRAVENOUS; SUBCUTANEOUS
Status: DISCONTINUED | OUTPATIENT
Start: 2024-03-04 | End: 2024-03-04 | Stop reason: HOSPADM

## 2024-03-04 RX ORDER — KETOROLAC TROMETHAMINE 30 MG/ML
30 INJECTION, SOLUTION INTRAMUSCULAR; INTRAVENOUS EVERY 6 HOURS PRN
Status: DISCONTINUED | OUTPATIENT
Start: 2024-03-04 | End: 2024-03-04 | Stop reason: HOSPADM

## 2024-03-04 RX ORDER — SODIUM CHLORIDE 9 MG/ML
40 INJECTION, SOLUTION INTRAVENOUS AS NEEDED
Status: DISCONTINUED | OUTPATIENT
Start: 2024-03-04 | End: 2024-03-04 | Stop reason: HOSPADM

## 2024-03-04 RX ORDER — PROPOFOL 10 MG/ML
INJECTION, EMULSION INTRAVENOUS AS NEEDED
Status: DISCONTINUED | OUTPATIENT
Start: 2024-03-04 | End: 2024-03-04 | Stop reason: SURG

## 2024-03-04 RX ORDER — MIDAZOLAM HYDROCHLORIDE 1 MG/ML
1 INJECTION INTRAMUSCULAR; INTRAVENOUS
Status: DISCONTINUED | OUTPATIENT
Start: 2024-03-04 | End: 2024-03-04 | Stop reason: HOSPADM

## 2024-03-04 RX ORDER — SODIUM CHLORIDE 0.9 % (FLUSH) 0.9 %
10 SYRINGE (ML) INJECTION AS NEEDED
Status: DISCONTINUED | OUTPATIENT
Start: 2024-03-04 | End: 2024-03-04 | Stop reason: HOSPADM

## 2024-03-04 RX ORDER — FENTANYL CITRATE 50 UG/ML
INJECTION, SOLUTION INTRAMUSCULAR; INTRAVENOUS AS NEEDED
Status: DISCONTINUED | OUTPATIENT
Start: 2024-03-04 | End: 2024-03-04 | Stop reason: SURG

## 2024-03-04 RX ORDER — LIDOCAINE HYDROCHLORIDE 20 MG/ML
JELLY TOPICAL AS NEEDED
Status: DISCONTINUED | OUTPATIENT
Start: 2024-03-04 | End: 2024-03-04 | Stop reason: HOSPADM

## 2024-03-04 RX ORDER — SODIUM CHLORIDE, SODIUM LACTATE, POTASSIUM CHLORIDE, CALCIUM CHLORIDE 600; 310; 30; 20 MG/100ML; MG/100ML; MG/100ML; MG/100ML
125 INJECTION, SOLUTION INTRAVENOUS ONCE
Status: COMPLETED | OUTPATIENT
Start: 2024-03-04 | End: 2024-03-04

## 2024-03-04 RX ORDER — MIDAZOLAM HYDROCHLORIDE 1 MG/ML
INJECTION INTRAMUSCULAR; INTRAVENOUS AS NEEDED
Status: DISCONTINUED | OUTPATIENT
Start: 2024-03-04 | End: 2024-03-04 | Stop reason: SURG

## 2024-03-04 RX ORDER — IPRATROPIUM BROMIDE AND ALBUTEROL SULFATE 2.5; .5 MG/3ML; MG/3ML
3 SOLUTION RESPIRATORY (INHALATION) ONCE AS NEEDED
Status: DISCONTINUED | OUTPATIENT
Start: 2024-03-04 | End: 2024-03-04 | Stop reason: HOSPADM

## 2024-03-04 RX ADMIN — FENTANYL CITRATE 100 MCG: 50 INJECTION INTRAMUSCULAR; INTRAVENOUS at 08:41

## 2024-03-04 RX ADMIN — PROPOFOL 200 MG: 10 INJECTION, EMULSION INTRAVENOUS at 08:45

## 2024-03-04 RX ADMIN — GENTAMICIN SULFATE 80 MG: 80 INJECTION, SOLUTION INTRAVENOUS at 08:41

## 2024-03-04 RX ADMIN — SODIUM CHLORIDE, POTASSIUM CHLORIDE, SODIUM LACTATE AND CALCIUM CHLORIDE: 600; 310; 30; 20 INJECTION, SOLUTION INTRAVENOUS at 08:41

## 2024-03-04 RX ADMIN — ONDANSETRON 4 MG: 2 INJECTION INTRAMUSCULAR; INTRAVENOUS at 08:41

## 2024-03-04 RX ADMIN — MIDAZOLAM HYDROCHLORIDE 2 MG: 1 INJECTION, SOLUTION INTRAMUSCULAR; INTRAVENOUS at 08:41

## 2024-03-04 NOTE — ANESTHESIA PROCEDURE NOTES
Airway  Urgency: elective    Date/Time: 3/4/2024 8:46 AM  Airway not difficult    General Information and Staff    Patient location during procedure: OR  CRNA/CAA: Kaushik Parikh CRNA    Indications and Patient Condition    Preoxygenated: yes  MILS not maintained throughout  Mask difficulty assessment: 0 - not attempted    Final Airway Details  Final airway type: supraglottic airway      Successful airway: unique  Size 4     Number of attempts at approach: 1  Assessment: lips, teeth, and gum same as pre-op and atraumatic intubation    Additional Comments  Atraumatic LMA placement, dentition unchanged.

## 2024-03-04 NOTE — OP NOTE
Estevan [place]  3/4/2024    Pre-op Diagnosis:   Right ureteral stone [N20.1]    Post-op Diagnosis:     Post-Op Diagnosis Codes:     * Right ureteral stone [N20.1]    Procedure/CPT® Codes:  24-year-old white male who has a second stone being a right distal ureteral stone here for ureteroscopy still having pain but it is intermittent following an informed consent brought the procedure suite prepped and draped in sterile fashion I used the Rivera 4.5 [place] ureteroscope easily passed that up the right orifice there was some scuffing of passage all the way to the renal pelvis I saw no stones they did a careful retrograde showing good free flow and emptying by bolus contraction no dilation and no filling defect consistent with stone he clearly passed he tolerated it well    Procedure(s):  URETEROSCOPY  RETROGRADE PYELOGRAM    Surgeon(s):  Cornell Tate MD    Anesthesia: see anesthesia record    Staff:   Circulator: Sammi Ha RN  Scrub Person: Valentina Gimenez LPN; Hanh Flores    Estimated Blood Loss: none  Urine Voided: * No values recorded between 3/4/2024  8:42 AM and 3/4/2024  9:02 AM *    Specimens:                None      Drains: None    Findings: Passed right ureteral stone    Blood: N/A    Complications: None    Grafts and Implants: None    Cornell Tate MD     Date: 3/4/2024  Time: 09:11 EST

## 2024-03-04 NOTE — ANESTHESIA PREPROCEDURE EVALUATION
Anesthesia Evaluation     Patient summary reviewed and Nursing notes reviewed   history of anesthetic complications:  PONV  NPO Solid Status: > 8 hours  NPO Liquid Status: > 8 hours           Airway   Mallampati: II  TM distance: >3 FB  Neck ROM: full  No difficulty expected  Dental - normal exam   (+) poor dentition    Pulmonary - normal exam    breath sounds clear to auscultation  (+) asthma,  Cardiovascular - negative cardio ROS and normal exam  Exercise tolerance: good (4-7 METS)    NYHA Classification: II  Rhythm: regular  Rate: normal        Neuro/Psych- negative ROS  GI/Hepatic/Renal/Endo    (+) obesity, GERD, renal disease-    Musculoskeletal (-) negative ROS    Abdominal   (+) obese    Bowel sounds: normal.   Substance History - negative use     OB/GYN negative ob/gyn ROS         Other - negative ROS                         Anesthesia Plan    ASA 2     general     intravenous induction     Anesthetic plan, risks, benefits, and alternatives have been provided, discussed and informed consent has been obtained with: patient.    Plan discussed with CRNA.

## 2024-03-04 NOTE — ANESTHESIA POSTPROCEDURE EVALUATION
Patient: Estevan Galindo    Procedure Summary       Date: 03/04/24 Room / Location: [facility] OR 06 /  [facility] OR    Anesthesia Start: 0841 Anesthesia Stop: 0902    Procedure: URETEROSCOPY and RETROGRADE PYELOGRAM (Right) Diagnosis:       Right ureteral stone      (Right ureteral stone [N20.1])    Surgeons: Cornell Tate MD Provider: Juan Vazquez DO    Anesthesia Type: general ASA Status: 2            Anesthesia Type: general    Vitals  Vitals Value Taken Time   /66 03/04/24 0930   Temp 97.3 °F (36.3 °C) 03/04/24 0903   Pulse 68 03/04/24 0933   Resp 14 03/04/24 0923   SpO2 96 % 03/04/24 0933   Vitals shown include unfiled device data.        Post Anesthesia Care and Evaluation    Patient location during evaluation: bedside  Patient participation: complete - patient participated  Level of consciousness: awake and alert  Pain score: 1  Pain management: adequate    Airway patency: patent  Anesthetic complications: No anesthetic complications  PONV Status: none  Cardiovascular status: acceptable  Respiratory status: acceptable  Hydration status: acceptable

## 2024-09-02 ENCOUNTER — APPOINTMENT (OUTPATIENT)
Dept: GENERAL RADIOLOGY | Facility: HOSPITAL | Age: 24
End: 2024-09-02
Payer: COMMERCIAL

## 2024-09-02 ENCOUNTER — HOSPITAL ENCOUNTER (EMERGENCY)
Facility: HOSPITAL | Age: 24
Discharge: HOME | End: 2024-09-02
Payer: COMMERCIAL

## 2024-09-02 VITALS
WEIGHT: 223 LBS | BODY MASS INDEX: 35 KG/M2 | OXYGEN SATURATION: 95 % | HEIGHT: 67 IN | SYSTOLIC BLOOD PRESSURE: 130 MMHG | DIASTOLIC BLOOD PRESSURE: 75 MMHG | TEMPERATURE: 98.3 F | HEART RATE: 82 BPM | RESPIRATION RATE: 14 BRPM

## 2024-09-02 DIAGNOSIS — A08.4 VIRAL GASTROENTERITIS: Primary | ICD-10-CM

## 2024-09-02 LAB
ALBUMIN SERPL-MCNC: 4.5 G/DL (ref 3.5–5.2)
ALBUMIN/GLOB SERPL: 1.3 G/DL
ALP SERPL-CCNC: 71 U/L (ref 39–117)
ALT SERPL W P-5'-P-CCNC: 29 U/L (ref 1–41)
ANION GAP SERPL CALCULATED.3IONS-SCNC: 17.3 MMOL/L (ref 5–15)
AST SERPL-CCNC: 29 U/L (ref 1–40)
B PARAPERT DNA SPEC QL NAA+PROBE: NOT DETECTED
B PERT DNA SPEC QL NAA+PROBE: NOT DETECTED
BACTERIA UR QL AUTO: NORMAL /HPF
BASOPHILS # BLD AUTO: 0.01 10*3/MM3 (ref 0–0.2)
BASOPHILS NFR BLD AUTO: 0.3 % (ref 0–1.5)
BILIRUB SERPL-MCNC: 0.4 MG/DL (ref 0–1.2)
BILIRUB UR QL STRIP: NEGATIVE
BUN SERPL-MCNC: 19 MG/DL (ref 6–20)
BUN/CREAT SERPL: 16.8 (ref 7–25)
C PNEUM DNA NPH QL NAA+NON-PROBE: NOT DETECTED
CALCIUM SPEC-SCNC: 9.5 MG/DL (ref 8.6–10.5)
CHLORIDE SERPL-SCNC: 102 MMOL/L (ref 98–107)
CLARITY UR: CLEAR
CO2 SERPL-SCNC: 21.7 MMOL/L (ref 22–29)
COLOR UR: YELLOW
CREAT SERPL-MCNC: 1.13 MG/DL (ref 0.76–1.27)
D-LACTATE SERPL-SCNC: 1.4 MMOL/L (ref 0.5–2)
DEPRECATED RDW RBC AUTO: 42.5 FL (ref 37–54)
EGFRCR SERPLBLD CKD-EPI 2021: 94.2 ML/MIN/1.73
EOSINOPHIL # BLD AUTO: 0 10*3/MM3 (ref 0–0.4)
EOSINOPHIL NFR BLD AUTO: 0 % (ref 0.3–6.2)
ERYTHROCYTE [DISTWIDTH] IN BLOOD BY AUTOMATED COUNT: 13.1 % (ref 12.3–15.4)
FLUAV SUBTYP SPEC NAA+PROBE: NOT DETECTED
FLUBV RNA ISLT QL NAA+PROBE: NOT DETECTED
GLOBULIN UR ELPH-MCNC: 3.5 GM/DL
GLUCOSE SERPL-MCNC: 80 MG/DL (ref 65–99)
GLUCOSE UR STRIP-MCNC: NEGATIVE MG/DL
HADV DNA SPEC NAA+PROBE: NOT DETECTED
HCOV 229E RNA SPEC QL NAA+PROBE: NOT DETECTED
HCOV HKU1 RNA SPEC QL NAA+PROBE: NOT DETECTED
HCOV NL63 RNA SPEC QL NAA+PROBE: NOT DETECTED
HCOV OC43 RNA SPEC QL NAA+PROBE: NOT DETECTED
HCT VFR BLD AUTO: 48.3 % (ref 37.5–51)
HGB BLD-MCNC: 15.5 G/DL (ref 13–17.7)
HGB UR QL STRIP.AUTO: NEGATIVE
HMPV RNA NPH QL NAA+NON-PROBE: NOT DETECTED
HOLD SPECIMEN: NORMAL
HOLD SPECIMEN: NORMAL
HPIV1 RNA ISLT QL NAA+PROBE: NOT DETECTED
HPIV2 RNA SPEC QL NAA+PROBE: NOT DETECTED
HPIV3 RNA NPH QL NAA+PROBE: NOT DETECTED
HPIV4 P GENE NPH QL NAA+PROBE: NOT DETECTED
HYALINE CASTS UR QL AUTO: NORMAL /LPF
IMM GRANULOCYTES # BLD AUTO: 0.03 10*3/MM3 (ref 0–0.05)
IMM GRANULOCYTES NFR BLD AUTO: 0.9 % (ref 0–0.5)
KETONES UR QL STRIP: ABNORMAL
LEUKOCYTE ESTERASE UR QL STRIP.AUTO: NEGATIVE
LYMPHOCYTES # BLD AUTO: 0.29 10*3/MM3 (ref 0.7–3.1)
LYMPHOCYTES NFR BLD AUTO: 8.9 % (ref 19.6–45.3)
M PNEUMO IGG SER IA-ACNC: NOT DETECTED
MCH RBC QN AUTO: 28.3 PG (ref 26.6–33)
MCHC RBC AUTO-ENTMCNC: 32.1 G/DL (ref 31.5–35.7)
MCV RBC AUTO: 88.3 FL (ref 79–97)
MONOCYTES # BLD AUTO: 0.37 10*3/MM3 (ref 0.1–0.9)
MONOCYTES NFR BLD AUTO: 11.3 % (ref 5–12)
NEUTROPHILS NFR BLD AUTO: 2.57 10*3/MM3 (ref 1.7–7)
NEUTROPHILS NFR BLD AUTO: 78.6 % (ref 42.7–76)
NITRITE UR QL STRIP: NEGATIVE
NRBC BLD AUTO-RTO: 0 /100 WBC (ref 0–0.2)
PH UR STRIP.AUTO: 6 [PH] (ref 5–8)
PLATELET # BLD AUTO: 155 10*3/MM3 (ref 140–450)
PMV BLD AUTO: 10.3 FL (ref 6–12)
POTASSIUM SERPL-SCNC: 3.8 MMOL/L (ref 3.5–5.2)
PROT SERPL-MCNC: 8 G/DL (ref 6–8.5)
PROT UR QL STRIP: ABNORMAL
RBC # BLD AUTO: 5.47 10*6/MM3 (ref 4.14–5.8)
RBC # UR STRIP: NORMAL /HPF
REF LAB TEST METHOD: NORMAL
RHINOVIRUS RNA SPEC NAA+PROBE: NOT DETECTED
RSV RNA NPH QL NAA+NON-PROBE: NOT DETECTED
SARS-COV-2 RNA NPH QL NAA+NON-PROBE: NOT DETECTED
SODIUM SERPL-SCNC: 141 MMOL/L (ref 136–145)
SP GR UR STRIP: >=1.03 (ref 1–1.03)
SQUAMOUS #/AREA URNS HPF: NORMAL /HPF
UROBILINOGEN UR QL STRIP: ABNORMAL
WBC # UR STRIP: NORMAL /HPF
WBC NRBC COR # BLD AUTO: 3.27 10*3/MM3 (ref 3.4–10.8)
WHOLE BLOOD HOLD COAG: NORMAL
WHOLE BLOOD HOLD SPECIMEN: NORMAL

## 2024-09-02 PROCEDURE — 25010000002 ONDANSETRON PER 1 MG

## 2024-09-02 PROCEDURE — 0202U NFCT DS 22 TRGT SARS-COV-2: CPT

## 2024-09-02 PROCEDURE — 71045 X-RAY EXAM CHEST 1 VIEW: CPT

## 2024-09-02 PROCEDURE — 74018 RADEX ABDOMEN 1 VIEW: CPT

## 2024-09-02 PROCEDURE — 85025 COMPLETE CBC W/AUTO DIFF WBC: CPT

## 2024-09-02 PROCEDURE — 74018 RADEX ABDOMEN 1 VIEW: CPT | Performed by: RADIOLOGY

## 2024-09-02 PROCEDURE — 25810000003 SODIUM CHLORIDE 0.9 % SOLUTION

## 2024-09-02 PROCEDURE — 83605 ASSAY OF LACTIC ACID: CPT

## 2024-09-02 PROCEDURE — 81001 URINALYSIS AUTO W/SCOPE: CPT

## 2024-09-02 PROCEDURE — 96374 THER/PROPH/DIAG INJ IV PUSH: CPT

## 2024-09-02 PROCEDURE — 80053 COMPREHEN METABOLIC PANEL: CPT

## 2024-09-02 PROCEDURE — 36415 COLL VENOUS BLD VENIPUNCTURE: CPT

## 2024-09-02 PROCEDURE — 71045 X-RAY EXAM CHEST 1 VIEW: CPT | Performed by: RADIOLOGY

## 2024-09-02 PROCEDURE — 99283 EMERGENCY DEPT VISIT LOW MDM: CPT

## 2024-09-02 RX ORDER — ACETAMINOPHEN 500 MG
1000 TABLET ORAL ONCE
Status: COMPLETED | OUTPATIENT
Start: 2024-09-02 | End: 2024-09-02

## 2024-09-02 RX ORDER — ONDANSETRON 2 MG/ML
4 INJECTION INTRAMUSCULAR; INTRAVENOUS ONCE
Status: COMPLETED | OUTPATIENT
Start: 2024-09-02 | End: 2024-09-02

## 2024-09-02 RX ORDER — ONDANSETRON 4 MG/1
4 TABLET, FILM COATED ORAL EVERY 8 HOURS PRN
Qty: 9 TABLET | Refills: 0 | Status: SHIPPED | OUTPATIENT
Start: 2024-09-02 | End: 2024-09-05

## 2024-09-02 RX ADMIN — ACETAMINOPHEN 1000 MG: 500 TABLET ORAL at 19:49

## 2024-09-02 RX ADMIN — ONDANSETRON 4 MG: 2 INJECTION INTRAMUSCULAR; INTRAVENOUS at 19:49

## 2024-09-02 RX ADMIN — SODIUM CHLORIDE 1000 ML: 9 INJECTION, SOLUTION INTRAVENOUS at 18:18

## 2024-09-02 RX ADMIN — SODIUM CHLORIDE 1000 ML: 9 INJECTION, SOLUTION INTRAVENOUS at 19:49

## 2024-09-02 NOTE — Clinical Note
[facility] EMERGENCY DEPARTMENT  [address] [address]  Phone: [phone]    Estevan Galindo was seen and treated in our emergency department on 9/2/2024.  He may return to work on 09/04/2024.         Thank you for choosing [facility].    Leland Ewing, DO

## 2024-09-03 NOTE — DISCHARGE INSTRUCTIONS
You have a viral gastroenteritis.  I recommend frequent hydration ,Tylenol as needed for fever and bodyaches.   If your symptoms worsen do not hesitate to return to ED.  I given you medication for nausea vomiting.  You may take 2 pills/day.

## 2024-09-03 NOTE — ED PROVIDER NOTES
Subjective   History of Present Illness  24-year-old male with GERD and asthma presenting with acute nausea vomiting and stomach pain.  There is associated diarrhea.  Patient states his symptoms started 2 days ago with fever nausea vomiting stomach pain.      Review of Systems   Constitutional: Negative.  Negative for fever.   HENT: Negative.     Respiratory: Negative.     Cardiovascular: Negative.  Negative for chest pain.   Gastrointestinal: Negative.  Positive for abdominal pain, diarrhea, nausea and vomiting.   Endocrine: Negative.    Genitourinary: Negative.  Negative for dysuria.   Skin: Negative.    Neurological: Negative.    Psychiatric/Behavioral: Negative.     All other systems reviewed and are negative.      Past Medical History:   Diagnosis Date    Asthma     GERD (gastroesophageal reflux disease)     Kidney stone     PONV (postoperative nausea and vomiting)        No Known Allergies    Past Surgical History:   Procedure Laterality Date    CYSTOSCOPY URETEROSCOPY LASER LITHOTRIPSY Right 07/08/2020    Procedure: RIGHT CYSTOSCOPY URETEROSCOPY WITH RIGHT RETROGRADES;  Surgeon: Cornell Tate MD;  Location: [facility];  Service: Urology;  Laterality: Right;    EAR TUBES      URETEROSCOPY LASER LITHOTRIPSY WITH STENT INSERTION Right 3/4/2024    Procedure: URETEROSCOPY and RETROGRADE PYELOGRAM;  Surgeon: Cornell Tate MD;  Location: [facility];  Service: Urology;  Laterality: Right;       Family History   Problem Relation Age of Onset    No Known Problems Father     No Known Problems Mother        Social History     Socioeconomic History    Marital status: Single   Tobacco Use    Smoking status: Never    Smokeless tobacco: Never   Vaping Use    Vaping status: Never Used   Substance and Sexual Activity    Alcohol use: Never    Drug use: Never    Sexual activity: Defer           Objective   Physical Exam  Vitals and nursing note reviewed.   Constitutional:       General: He is not in acute  distress.     Appearance: He is well-developed. He is not diaphoretic.   HENT:      Head: Normocephalic and atraumatic.      Right Ear: External ear normal.      Left Ear: External ear normal.      Nose: Nose normal.   Eyes:      Conjunctiva/sclera: Conjunctivae normal.      Pupils: Pupils are equal, round, and reactive to light.   Neck:      Vascular: No JVD.      Trachea: No tracheal deviation.   Cardiovascular:      Rate and Rhythm: Normal rate and regular rhythm.      Heart sounds: Normal heart sounds. No murmur heard.  Pulmonary:      Effort: Pulmonary effort is normal. No respiratory distress.      Breath sounds: Normal breath sounds. No wheezing.   Abdominal:      General: Bowel sounds are normal.      Palpations: Abdomen is soft.      Tenderness: There is no abdominal tenderness.   Musculoskeletal:         General: No deformity. Normal range of motion.      Cervical back: Normal range of motion and neck supple.   Skin:     General: Skin is warm and dry.      Coloration: Skin is not pale.      Findings: No erythema or rash.   Neurological:      Mental Status: He is alert and oriented to person, place, and time.      Cranial Nerves: No cranial nerve deficit.   Psychiatric:         Behavior: Behavior normal.         Thought Content: Thought content normal.         Procedures           ED Course                                             Medical Decision Making  Symptoms improved after receiving fluids and Zofran.  His blood work was unremarkable.  Discussed findings with patient.  Discussed plan of care and ED precautions.  All questions were answered.  Patient understands and agrees with plan.  Patient requesting a work note.    Problems Addressed:  Viral gastroenteritis: complicated acute illness or injury    Amount and/or Complexity of Data Reviewed  Labs: ordered.  Radiology: ordered.    Risk  OTC drugs.  Prescription drug management.        Final diagnoses:   Viral gastroenteritis       ED Disposition  ED  Disposition       ED Disposition   Discharge    Condition   Stable    Comment   --               PATIENT CONNECTION - JOSE  See Provider List  [facility] [address]  [phone]  Schedule an appointment as soon as possible for a visit in 1 week           Medication List        New Prescriptions      ondansetron 4 MG tablet  Commonly known as: ZOFRAN  Take 1 tablet by mouth Every 8 (Eight) Hours As Needed for Nausea or Vomiting for up to 3 days.               Where to Get Your Medications        These medications were sent to [facility]/pharmacy #[id number] - [place], [place] - [address] [phone] [place] [id number] [address] [address]      Phone: [phone]   ondansetron 4 MG tablet            Leland Ewing,   09/02/24 2033

## (undated) DEVICE — COR CYSTO: Brand: MEDLINE INDUSTRIES, INC.

## (undated) DEVICE — GLV SURG PREMIERPRO MIC LTX PF SZ8 BRN

## (undated) DEVICE — NITINOL STONE RETRIEVAL BASKET: Brand: ZERO TIP